# Patient Record
Sex: FEMALE | Race: WHITE | HISPANIC OR LATINO
[De-identification: names, ages, dates, MRNs, and addresses within clinical notes are randomized per-mention and may not be internally consistent; named-entity substitution may affect disease eponyms.]

---

## 2017-02-23 ENCOUNTER — APPOINTMENT (OUTPATIENT)
Dept: VASCULAR SURGERY | Facility: CLINIC | Age: 57
End: 2017-02-23

## 2017-02-23 VITALS
DIASTOLIC BLOOD PRESSURE: 75 MMHG | HEIGHT: 67 IN | WEIGHT: 147 LBS | SYSTOLIC BLOOD PRESSURE: 117 MMHG | BODY MASS INDEX: 23.07 KG/M2 | TEMPERATURE: 98 F | HEART RATE: 65 BPM

## 2017-02-23 DIAGNOSIS — Z82.49 FAMILY HISTORY OF ISCHEMIC HEART DISEASE AND OTHER DISEASES OF THE CIRCULATORY SYSTEM: ICD-10-CM

## 2017-02-23 DIAGNOSIS — Z00.00 ENCOUNTER FOR GENERAL ADULT MEDICAL EXAMINATION W/OUT ABNORMAL FINDINGS: ICD-10-CM

## 2017-02-23 DIAGNOSIS — Z86.19 PERSONAL HISTORY OF OTHER INFECTIOUS AND PARASITIC DISEASES: ICD-10-CM

## 2017-02-23 DIAGNOSIS — Z83.3 FAMILY HISTORY OF DIABETES MELLITUS: ICD-10-CM

## 2017-02-24 PROBLEM — Z86.19 HISTORY OF HUMAN IMMUNODEFICIENCY VIRUS INFECTION: Status: RESOLVED | Noted: 2017-02-24 | Resolved: 2017-02-24

## 2017-02-24 RX ORDER — ENOXAPARIN SODIUM 100 MG/ML
100 INJECTION SUBCUTANEOUS
Qty: 15 | Refills: 0 | Status: COMPLETED | COMMUNITY
Start: 2016-11-11 | End: 2017-02-24

## 2017-02-24 RX ORDER — ZOLPIDEM TARTRATE 10 MG/1
10 TABLET ORAL
Qty: 30 | Refills: 0 | Status: ACTIVE | COMMUNITY
Start: 2016-11-17

## 2017-02-24 RX ORDER — ACETAMINOPHEN 325 MG/1
325 TABLET ORAL
Qty: 180 | Refills: 0 | Status: ACTIVE | COMMUNITY
Start: 2016-10-21

## 2017-02-24 RX ORDER — AMOXICILLIN 500 MG/1
500 CAPSULE ORAL
Qty: 21 | Refills: 0 | Status: ACTIVE | COMMUNITY
Start: 2016-09-09

## 2017-02-24 RX ORDER — OXYBUTYNIN CHLORIDE 5 MG/1
5 TABLET, EXTENDED RELEASE ORAL
Qty: 30 | Refills: 0 | Status: ACTIVE | COMMUNITY
Start: 2016-08-31

## 2017-02-24 RX ORDER — WARFARIN 5 MG/1
5 TABLET ORAL
Qty: 60 | Refills: 0 | Status: COMPLETED | COMMUNITY
Start: 2016-11-22 | End: 2017-02-24

## 2017-02-24 RX ORDER — WARFARIN 10 MG/1
10 TABLET ORAL
Qty: 30 | Refills: 0 | Status: COMPLETED | COMMUNITY
Start: 2016-12-08 | End: 2017-02-24

## 2017-02-24 RX ORDER — SERTRALINE HYDROCHLORIDE 50 MG/1
50 TABLET, FILM COATED ORAL
Qty: 30 | Refills: 0 | Status: ACTIVE | COMMUNITY
Start: 2016-11-17

## 2017-02-24 RX ORDER — MELOXICAM 7.5 MG/1
7.5 TABLET ORAL
Qty: 30 | Refills: 0 | Status: ACTIVE | COMMUNITY
Start: 2017-02-16

## 2017-02-24 RX ORDER — WARFARIN 4 MG/1
4 TABLET ORAL
Qty: 30 | Refills: 0 | Status: COMPLETED | COMMUNITY
Start: 2016-10-28 | End: 2017-02-24

## 2017-02-24 RX ORDER — TRAZODONE HYDROCHLORIDE 50 MG/1
50 TABLET ORAL
Qty: 30 | Refills: 0 | Status: ACTIVE | COMMUNITY
Start: 2016-11-17

## 2017-03-09 ENCOUNTER — APPOINTMENT (OUTPATIENT)
Dept: VASCULAR SURGERY | Facility: CLINIC | Age: 57
End: 2017-03-09

## 2017-03-20 ENCOUNTER — APPOINTMENT (OUTPATIENT)
Dept: OTOLARYNGOLOGY | Facility: CLINIC | Age: 57
End: 2017-03-20

## 2017-05-25 ENCOUNTER — APPOINTMENT (OUTPATIENT)
Dept: VASCULAR SURGERY | Facility: CLINIC | Age: 57
End: 2017-05-25

## 2017-06-08 ENCOUNTER — APPOINTMENT (OUTPATIENT)
Dept: VASCULAR SURGERY | Facility: CLINIC | Age: 57
End: 2017-06-08

## 2017-06-08 VITALS
DIASTOLIC BLOOD PRESSURE: 86 MMHG | SYSTOLIC BLOOD PRESSURE: 143 MMHG | HEART RATE: 71 BPM | HEIGHT: 67 IN | TEMPERATURE: 97.7 F | BODY MASS INDEX: 21.5 KG/M2 | WEIGHT: 137 LBS

## 2017-09-14 ENCOUNTER — APPOINTMENT (OUTPATIENT)
Dept: VASCULAR SURGERY | Facility: CLINIC | Age: 57
End: 2017-09-14
Payer: MEDICAID

## 2017-09-14 VITALS — DIASTOLIC BLOOD PRESSURE: 80 MMHG | HEART RATE: 108 BPM | SYSTOLIC BLOOD PRESSURE: 120 MMHG

## 2017-09-14 VITALS
BODY MASS INDEX: 21.5 KG/M2 | SYSTOLIC BLOOD PRESSURE: 114 MMHG | HEIGHT: 67 IN | HEART RATE: 64 BPM | DIASTOLIC BLOOD PRESSURE: 76 MMHG | WEIGHT: 137 LBS | TEMPERATURE: 97.8 F

## 2017-09-14 DIAGNOSIS — S85.599A: ICD-10-CM

## 2017-09-14 PROCEDURE — 99214 OFFICE O/P EST MOD 30 MIN: CPT | Mod: 25

## 2017-09-14 PROCEDURE — 93970 EXTREMITY STUDY: CPT

## 2017-10-13 ENCOUNTER — APPOINTMENT (OUTPATIENT)
Dept: VASCULAR SURGERY | Facility: CLINIC | Age: 57
End: 2017-10-13
Payer: MEDICAID

## 2017-10-13 PROCEDURE — 36478 ENDOVENOUS LASER 1ST VEIN: CPT

## 2017-10-16 ENCOUNTER — APPOINTMENT (OUTPATIENT)
Dept: VASCULAR SURGERY | Facility: CLINIC | Age: 57
End: 2017-10-16

## 2017-10-16 ENCOUNTER — APPOINTMENT (OUTPATIENT)
Dept: VASCULAR SURGERY | Facility: CLINIC | Age: 57
End: 2017-10-16
Payer: MEDICAID

## 2017-10-16 ENCOUNTER — OTHER (OUTPATIENT)
Age: 57
End: 2017-10-16

## 2017-10-16 PROCEDURE — 93971 EXTREMITY STUDY: CPT | Mod: RT

## 2017-10-19 ENCOUNTER — APPOINTMENT (OUTPATIENT)
Dept: VASCULAR SURGERY | Facility: CLINIC | Age: 57
End: 2017-10-19
Payer: MEDICAID

## 2017-10-19 ENCOUNTER — APPOINTMENT (OUTPATIENT)
Dept: VASCULAR SURGERY | Facility: CLINIC | Age: 57
End: 2017-10-19

## 2017-10-19 PROCEDURE — 93971 EXTREMITY STUDY: CPT | Mod: RT

## 2017-10-23 ENCOUNTER — APPOINTMENT (OUTPATIENT)
Dept: VASCULAR SURGERY | Facility: CLINIC | Age: 57
End: 2017-10-23
Payer: MEDICAID

## 2017-10-23 PROCEDURE — 93971 EXTREMITY STUDY: CPT | Mod: RT

## 2017-10-25 ENCOUNTER — OTHER (OUTPATIENT)
Age: 57
End: 2017-10-25

## 2017-11-06 ENCOUNTER — MEDICATION RENEWAL (OUTPATIENT)
Age: 57
End: 2017-11-06

## 2017-11-06 ENCOUNTER — APPOINTMENT (OUTPATIENT)
Dept: VASCULAR SURGERY | Facility: CLINIC | Age: 57
End: 2017-11-06
Payer: MEDICAID

## 2017-11-06 PROCEDURE — 93971 EXTREMITY STUDY: CPT

## 2017-11-16 ENCOUNTER — APPOINTMENT (OUTPATIENT)
Dept: VASCULAR SURGERY | Facility: CLINIC | Age: 57
End: 2017-11-16

## 2017-11-30 ENCOUNTER — APPOINTMENT (OUTPATIENT)
Dept: VASCULAR SURGERY | Facility: CLINIC | Age: 57
End: 2017-11-30
Payer: MEDICAID

## 2017-11-30 VITALS
HEART RATE: 78 BPM | HEIGHT: 67 IN | DIASTOLIC BLOOD PRESSURE: 79 MMHG | SYSTOLIC BLOOD PRESSURE: 129 MMHG | WEIGHT: 137 LBS | BODY MASS INDEX: 21.5 KG/M2 | TEMPERATURE: 97.4 F

## 2017-11-30 PROCEDURE — 99214 OFFICE O/P EST MOD 30 MIN: CPT | Mod: 25

## 2018-03-15 ENCOUNTER — APPOINTMENT (OUTPATIENT)
Dept: VASCULAR SURGERY | Facility: CLINIC | Age: 58
End: 2018-03-15

## 2018-06-29 ENCOUNTER — APPOINTMENT (OUTPATIENT)
Dept: VASCULAR SURGERY | Facility: CLINIC | Age: 58
End: 2018-06-29

## 2018-09-18 ENCOUNTER — EMERGENCY (EMERGENCY)
Facility: HOSPITAL | Age: 58
LOS: 1 days | Discharge: HOME | End: 2018-09-18
Attending: EMERGENCY MEDICINE
Payer: MEDICAID

## 2018-09-18 VITALS
RESPIRATION RATE: 18 BRPM | TEMPERATURE: 98 F | OXYGEN SATURATION: 98 % | SYSTOLIC BLOOD PRESSURE: 130 MMHG | HEART RATE: 80 BPM | DIASTOLIC BLOOD PRESSURE: 80 MMHG

## 2018-09-18 DIAGNOSIS — Z98.89 OTHER SPECIFIED POSTPROCEDURAL STATES: Chronic | ICD-10-CM

## 2018-09-18 DIAGNOSIS — Z98.51 TUBAL LIGATION STATUS: Chronic | ICD-10-CM

## 2018-09-18 PROCEDURE — 99283 EMERGENCY DEPT VISIT LOW MDM: CPT

## 2018-09-18 PROCEDURE — 99284 EMERGENCY DEPT VISIT MOD MDM: CPT | Mod: 25

## 2018-09-18 PROCEDURE — 71046 X-RAY EXAM CHEST 2 VIEWS: CPT

## 2018-09-18 PROCEDURE — 71046 X-RAY EXAM CHEST 2 VIEWS: CPT | Mod: 26

## 2018-09-18 RX ORDER — PSEUDOEPHEDRINE HCL 30 MG
1 TABLET ORAL
Qty: 15 | Refills: 0 | OUTPATIENT
Start: 2018-09-18

## 2018-09-18 RX ADMIN — Medication 100 MILLIGRAM(S): at 17:54

## 2018-09-18 NOTE — ED PROVIDER NOTE - OBJECTIVE STATEMENT
57 y/o F patient with a significant PMHx of Bipolar disorder, Bronchitis, Depression, b/l dry eyes, cardiac murmur, substance abuse, Hepatitis B, Hepatitis C, HIV (last CD4 count 370, viral load undetectable), Insomnia, Osteoporosis, Popliteal aneurysm, Positive PPD treated in , Prediabetes, PTSD, Scoliosis and a significant PSHx of right inguinal hernia repair, tubal ligation,  section, lumpectomy, presents to the ED with a x2 day history of rhinorrhea, cough, nasal congestion and headache. Patient denies fever, shortness of breath, chest pain, urinary complaints, abdominal complaints or any other complaints. Patient is unable to tolerate PO. Patient is compliant with HIV medication and does not take prophylactic antibiotics. NKDA.

## 2018-09-18 NOTE — ED PROVIDER NOTE - PMH
Bipolar disorder    Bronchitis    Depression    Dry eyes, bilateral    H/O cardiac murmur    H/O: substance abuse    Hepatitis B    Hepatitis C    HIV (human immunodeficiency virus infection)    Insomnia    Osteoporosis    Popliteal aneurysm  right  Positive PPD, treated  1978 "I took INH snd B12 for a whole year, I have CXR's now"  Prediabetes    PTSD (post-traumatic stress disorder)    Scoliosis

## 2018-09-18 NOTE — ED PROVIDER NOTE - PROGRESS NOTE DETAILS
Patients cough improved with tessalon perles. CXR negative. Will D/C with doxy, sudafed and tessalon. Patient in agreement.

## 2018-09-18 NOTE — ED PROVIDER NOTE - PSH
H/O right inguinal hernia repair  x2-2007  H/O tubal ligation    H/O:  section    History of lumpectomy of left breast  2004

## 2018-09-18 NOTE — ED ADULT NURSE NOTE - NSIMPLEMENTINTERV_GEN_ALL_ED
Implemented All Universal Safety Interventions:  [facility] to call system. Call bell, personal items and telephone within reach. Instruct patient to call for assistance. Room bathroom lighting operational. Non-slip footwear when patient is off stretcher. Physically safe environment: no spills, clutter or unnecessary equipment. Stretcher in lowest position, wheels locked, appropriate side rails in place.

## 2018-09-18 NOTE — ED PROVIDER NOTE - MEDICAL DECISION MAKING DETAILS
Patient with headache and cough. Obtain chest X-ray and treat with Tessalon Perls, Doxycyline and Sudafed.

## 2019-05-22 NOTE — ED ADULT NURSE NOTE - CAS DISCH TRANSFER METHOD
Patient has an upcoming visit with Dr Cantu scheduled for 6/19/19.  Blood pressure to be discussed at that time.   Private car

## 2019-10-23 ENCOUNTER — APPOINTMENT (OUTPATIENT)
Dept: PULMONOLOGY | Facility: CLINIC | Age: 59
End: 2019-10-23
Payer: MEDICAID

## 2019-10-23 VITALS
OXYGEN SATURATION: 98 % | SYSTOLIC BLOOD PRESSURE: 133 MMHG | BODY MASS INDEX: 24.01 KG/M2 | HEIGHT: 67 IN | WEIGHT: 153 LBS | TEMPERATURE: 98.1 F | DIASTOLIC BLOOD PRESSURE: 83 MMHG | HEART RATE: 71 BPM | RESPIRATION RATE: 18 BRPM

## 2019-10-23 PROCEDURE — 94060 EVALUATION OF WHEEZING: CPT

## 2019-10-23 PROCEDURE — 99204 OFFICE O/P NEW MOD 45 MIN: CPT | Mod: 25

## 2019-10-23 PROCEDURE — ZZZZZ: CPT

## 2019-10-23 PROCEDURE — 94726 PLETHYSMOGRAPHY LUNG VOLUMES: CPT

## 2019-10-23 PROCEDURE — 94729 DIFFUSING CAPACITY: CPT

## 2019-10-23 NOTE — END OF VISIT
[FreeTextEntry3] : I directly supervised nurse practitioner Delta Romero and was present during key points of his history and physical. I agree with his history, physical and assessment.\par

## 2019-10-23 NOTE — ASSESSMENT
[FreeTextEntry1] : Abnormal Chest CT: Patient advised to obtain CD of CT scan and drop off at office. Will review and call patient with findings and next steps, if needed. PFT today is normal, patient asymptomatic. Personally reviewed CT scans from 2002 and 2003 demonstrating 4mm RML nodule.

## 2019-10-23 NOTE — HISTORY OF PRESENT ILLNESS
[FreeTextEntry1] : 60yo F with PMH lung nodule (2002), substance abuse, anxiety, depression presents for evaluation of abnormal CT scan.\par \par Referral form states stable 5mm lung nodule but also with RLL bronchiolitic changes. No report or imaging is available for review.\par \par Patient denies hx of smoking tobacco however smoked crack for about 8 years, most days of the week, quit in 1991. Has not inhaled any substances since she quit in 1991.\par \par Denies hx of lung disease. Asymptomatic from pulmonary perspective. Denies fevers, chills, dyspnea, chest pain/tightness, cough, wheeze.

## 2019-10-23 NOTE — PHYSICAL EXAM
[General Appearance - Well Developed] : well developed [Normal Appearance] : normal appearance [Well Groomed] : well groomed [General Appearance - Well Nourished] : well nourished [General Appearance - In No Acute Distress] : no acute distress [No Deformities] : no deformities [Eyelids - No Xanthelasma] : the eyelids demonstrated no xanthelasmas [Normal Conjunctiva] : the conjunctiva exhibited no abnormalities [Normal Oropharynx] : normal oropharynx [Neck Cervical Mass (___cm)] : no neck mass was observed [Neck Appearance] : the appearance of the neck was normal [Thyroid Diffuse Enlargement] : the thyroid was not enlarged [Thyroid Nodule] : there were no palpable thyroid nodules [Jugular Venous Distention Increased] : there was no jugular-venous distention [Heart Rate And Rhythm] : heart rate and rhythm were normal [Heart Sounds] : normal S1 and S2 [Murmurs] : no murmurs present [Respiration, Rhythm And Depth] : normal respiratory rhythm and effort [Exaggerated Use Of Accessory Muscles For Inspiration] : no accessory muscle use [Abdomen Soft] : soft [Auscultation Breath Sounds / Voice Sounds] : lungs were clear to auscultation bilaterally [Abdomen Tenderness] : non-tender [Abdomen Mass (___ Cm)] : no abdominal mass palpated [Gait - Sufficient For Exercise Testing] : the gait was sufficient for exercise testing [Abnormal Walk] : normal gait [Nail Clubbing] : no clubbing of the fingernails [Petechial Hemorrhages (___cm)] : no petechial hemorrhages [Cyanosis, Localized] : no localized cyanosis [Skin Color & Pigmentation] : normal skin color and pigmentation [Deep Tendon Reflexes (DTR)] : deep tendon reflexes were 2+ and symmetric [] : no rash [Skin Turgor] : normal skin turgor [Sensation] : the sensory exam was normal to light touch and pinprick [Oriented To Time, Place, And Person] : oriented to person, place, and time [No Focal Deficits] : no focal deficits [Affect] : the affect was normal [Impaired Insight] : insight and judgment were intact

## 2019-10-23 NOTE — CONSULT LETTER
[Dear  ___] : Dear  [unfilled], [Please see my note below.] : Please see my note below. [Consult Closing:] : Thank you very much for allowing me to participate in the care of this patient.  If you have any questions, please do not hesitate to contact me. [Sincerely,] : Sincerely, [FreeTextEntry2] : Jessica Perrin\par [address]\par [place], [place]\par  [FreeTextEntry3] : Homar Frazeir MD

## 2020-09-18 ENCOUNTER — APPOINTMENT (OUTPATIENT)
Dept: PULMONOLOGY | Facility: CLINIC | Age: 60
End: 2020-09-18

## 2020-09-25 ENCOUNTER — APPOINTMENT (OUTPATIENT)
Dept: PULMONOLOGY | Facility: CLINIC | Age: 60
End: 2020-09-25
Payer: MEDICAID

## 2020-09-25 VITALS
HEIGHT: 67 IN | BODY MASS INDEX: 24.48 KG/M2 | TEMPERATURE: 97.2 F | RESPIRATION RATE: 16 BRPM | OXYGEN SATURATION: 100 % | DIASTOLIC BLOOD PRESSURE: 85 MMHG | SYSTOLIC BLOOD PRESSURE: 129 MMHG | HEART RATE: 90 BPM | WEIGHT: 156 LBS

## 2020-09-25 PROCEDURE — 99214 OFFICE O/P EST MOD 30 MIN: CPT

## 2020-09-25 NOTE — HISTORY OF PRESENT ILLNESS
[TextBox_4] : 60yo F with PMH lung nodule (2002), substance abuse, anxiety, depression presents for evaluation of abnormal CT scan.\par \par Patient denies hx of smoking tobacco however smoked crack for about 8 years, most days of the week, quit in 1991. Has not inhaled any substances since she quit in 1991.\par \par Denies hx of lung disease. Asymptomatic from pulmonary perspective. Denies fevers, chills, dyspnea, chest pain/tightness, cough, wheeze. \par \par Today she presents with report from abnormal CT on 9/20/19 performed at [facility].\par \par ~~~~~\par CT Chest 9/20/19 IMPRESSION: (Comparison: 4/19/2019) The right lower lobe nodule is stable. However, the small patch of tree-in-bud opacity in the left lower lobe is more confluent and denser denoting worsening small airways disease (bronchioloitis).\par ~~~~~

## 2020-09-25 NOTE — CONSULT LETTER
[Dear  ___] : Dear  [unfilled], [Please see my note below.] : Please see my note below. [Consult Closing:] : Thank you very much for allowing me to participate in the care of this patient.  If you have any questions, please do not hesitate to contact me. [Sincerely,] : Sincerely, [FreeTextEntry2] : Jessica Perrin\par [address]\par [place], [place]\par  [FreeTextEntry3] : Homar Frazier MD

## 2020-09-25 NOTE — ASSESSMENT
[FreeTextEntry1] : Pulmonary Nodule: Patient remains asymptomatic. Have now been able to review report and images, 5mm nodule stable from April 2019 to September 2019. Will repeat CT now at 1 year interval, call with results.\par \par STEPHANIE, Delta Romero NP, am scribing for and in the presence of Dr. Homar Frazier, the following sections HISTORY OF PRESENT ILLNESS, PAST MEDICAL/FAMILY/SOCIAL HISTORY; REVIEW OF SYSTEMS; VITAL SIGNS; PHYSICAL EXAM; DISPOSITION.

## 2020-12-07 ENCOUNTER — APPOINTMENT (OUTPATIENT)
Dept: PULMONOLOGY | Facility: CLINIC | Age: 60
End: 2020-12-07
Payer: MEDICAID

## 2020-12-07 VITALS
DIASTOLIC BLOOD PRESSURE: 83 MMHG | SYSTOLIC BLOOD PRESSURE: 130 MMHG | RESPIRATION RATE: 18 BRPM | TEMPERATURE: 98 F | WEIGHT: 161 LBS | BODY MASS INDEX: 25.27 KG/M2 | HEART RATE: 72 BPM | HEIGHT: 67 IN | OXYGEN SATURATION: 99 %

## 2020-12-07 DIAGNOSIS — R93.89 ABNORMAL FINDINGS ON DIAGNOSTIC IMAGING OF OTHER SPECIFIED BODY STRUCTURES: ICD-10-CM

## 2020-12-07 PROCEDURE — 99072 ADDL SUPL MATRL&STAF TM PHE: CPT

## 2020-12-07 PROCEDURE — 99214 OFFICE O/P EST MOD 30 MIN: CPT

## 2020-12-07 NOTE — ASSESSMENT
[FreeTextEntry1] : Pulmonary Nodule: Patient remains asymptomatic. Now stable since April 2019. No further f/u needed. Patient advised to call office if symptoms present.\par \par I, Delta Romero NP, am scribing for and in the presence of Dr. Homar Frazier, the following sections HISTORY OF PRESENT ILLNESS, PAST MEDICAL/FAMILY/SOCIAL HISTORY; REVIEW OF SYSTEMS; VITAL SIGNS; PHYSICAL EXAM; DISPOSITION.

## 2020-12-07 NOTE — CONSULT LETTER
[Dear  ___] : Dear  [unfilled], [Please see my note below.] : Please see my note below. [Consult Closing:] : Thank you very much for allowing me to participate in the care of this patient.  If you have any questions, please do not hesitate to contact me. [Sincerely,] : Sincerely, [FreeTextEntry2] : Jessica Perrin\par [address]\par [place], [place]\par  [FreeTextEntry3] : Homar Frazier MD

## 2020-12-07 NOTE — HISTORY OF PRESENT ILLNESS
[TextBox_4] : 58yo F with PMH lung nodule (2002), substance abuse, anxiety, depression presents for evaluation of abnormal CT scan.\par \par Patient denies hx of smoking tobacco however smoked crack for about 8 years, most days of the week, quit in 1991. Has not inhaled any substances since she quit in 1991.\par \par She recently had cough, sore throat. COVID negative at PCP office, given inhaler plus OTC cough syrup. Now feeling improved. At baseline is asymptomatic from a pulmonary perspective.\par \par Today she presents with f/u CT to initially abnormal CT on 9/20/19 performed at [facility].\par \par ~~~~~\par CT Chest 10/22/2020 IMPRESSION: \par 1. Decreased size and conspicuity of right lower lobe solid nodule now measuring up to 4mm. No new pulmonary nodule is identified.\par 2. Decreased conspicuity of previously seen left lower lobe peripheral tree-in-bud nodularity, consistent with improving bronchiolitis.\par ~~~~~

## 2022-04-15 ENCOUNTER — APPOINTMENT (OUTPATIENT)
Dept: VASCULAR SURGERY | Facility: CLINIC | Age: 62
End: 2022-04-15
Payer: MEDICAID

## 2022-04-22 ENCOUNTER — APPOINTMENT (OUTPATIENT)
Dept: VASCULAR SURGERY | Facility: CLINIC | Age: 62
End: 2022-04-22
Payer: MEDICAID

## 2022-04-22 VITALS
SYSTOLIC BLOOD PRESSURE: 134 MMHG | DIASTOLIC BLOOD PRESSURE: 82 MMHG | WEIGHT: 170 LBS | HEIGHT: 67 IN | TEMPERATURE: 97 F | HEART RATE: 74 BPM | BODY MASS INDEX: 26.68 KG/M2

## 2022-04-22 VITALS — HEART RATE: 79 BPM | DIASTOLIC BLOOD PRESSURE: 83 MMHG | SYSTOLIC BLOOD PRESSURE: 123 MMHG

## 2022-04-22 PROCEDURE — 93970 EXTREMITY STUDY: CPT

## 2022-04-22 PROCEDURE — 99204 OFFICE O/P NEW MOD 45 MIN: CPT

## 2022-04-22 NOTE — HISTORY OF PRESENT ILLNESS
[FreeTextEntry1] : MARTINA GATES is a 61 year old female with PMHx significant for HIV, HLD, and lumbar arthritis who was previously seen in our office in [id number] for venous insufficiency. 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 Benoit in 2016. She completed 3 months of anticoagulation post-operatively.\par \par She is also s/p R GSV EVLT in 2017. 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 Patient was treated with Xarelto with resolution of thrombus. \par \par Today the patient reports bilateral leg discomfort associated with swelling, fatigue, heaviness, achiness, muscle cramping, itchiness, dry, flaky skin, and skin darkening at the ankles. \par Patient complains of bilateral painful varicose veins.\par Patient's symptoms have persisted despite conservative management with leg elevation, exercise, and compression stocking use for more than 3 months. \par Patient's symptoms are aggravated by prolonged standing, prolonged sitting, exercise.\par Nothing helps to alleviate patient's symptoms. \par Patient's symptoms interfere with the patient's ADLs such as work, shopping and housekeeping.  \par \par Patient's risks factor for venous disease are multiple pregnancies, family history of venous disease (mother, sisters), history of varicose veins, spider veins and deep vein thrombosis. \par \par Previous treatment, vein procedures and vein surgeries include: \par - wearing compression stockings for more than 3 months with little or no relief \par - vein ablations / vein surgery\par \par PMH: HIV on Genvoya, HLD, peripheral neuropathy, lumbar arthritis, provoked RLE DVT after vein ablation\par Psx: R popliteal vein aneurysm repair, R GSV EVLT, R inguinal hernia repair x 2\par NKDA\par \par VLE today is negative for DVT/SVT. R GSV is ablated in the thigh. Reflux is present in the R SFJ, R calf GSV, and L GSV. R popliteal vein measures 1.7 cm s/p repair (size stable since 2017). L popliteal vein is aneurysmal at 1.9 cm (1.1 cm previously in 2017). Per verbal report from tech, no thrombus present in popliteal venous aneurysms bilaterally.

## 2022-04-22 NOTE — PHYSICAL EXAM
[2+] : left 2+ [Ankle Swelling On The Left] : moderate [Calm] : calm [Skin Ulcer] : no ulcer [de-identified] : well appearing female, NAD  [de-identified] : unlabored [FreeTextEntry1] : LE vein findings: \par Varicosities (spider, reticular, varicose)  bilateral\par Edema mild bilateral \par Skin changes - dry, flaky skin, stasis dermatitis, hyperpigmentation in the gator area\par No Ulcer \par CEAP classification C4\par Palpable DP pulses bilaterally\par \par  [de-identified] : soft, NT [de-identified] : well healed S shaped incision in R popliteal fossa

## 2022-04-22 NOTE — ASSESSMENT
[FreeTextEntry1] : Ms. MARTINA GATES is a 61 year female with persistent and worsening bilateral lower extremity venous insufficiency, CEAP classification C4 which is unresponsive to at least 3 months of compression stockings 20-30 mmHg, leg elevation, and exercise. Patient has complaints of worsening bilateral leg discomfort with swelling, fatigue, heaviness, achiness, cramping, and painful varicosities.  The patient’s symptoms interfere with their ADL’s, such as walking, shopping and house work, and their ability to work and exercise. Patient has intact pulses in both legs without evidence of arterial insufficiency.  \par \par Treatment is indicated not for cosmetic reasons but for symptomatic venous reflux disease with symptoms which are refractory to conservative therapy. Venous duplex study demonstrates L GSV reflux in additional to large tributaries bilaterally. The need for definitive effective treatment is based on severe, interfering and worsening reflux symptoms with evidence of venous insufficiency on venous ultrasound. \par \par Patient is a candidate for endovenous ablation treatment of the L GSV and bilateral stab phlebectomy procedures. The risks and benefits of proposed treatment versus continued conservative management were discussed with the patient. Patient chooses endovenous ablation treatments. Treatment plan to be scheduled. \par \par Patient is s/p repair of R popliteal vein aneurysm which appears stable on ultrasound today. L popliteal vein is aneurysmal at 1.9 cm. Per verbal report, no thrombus visualized. Findings discussed with Dr. Benoit. No interventions at this time. Will continue to monitor. If L popliteal vein increases in size and/or presents with thrombus then will consider initiating antiplatelet therapy and surgical repair. Patient aware of plan and will schedule 6 month follow-up with surveillance VLE study. \par  \par Patient requests that provider speak with her PMD, Dr. Jessica Perrin ([phone], [phone]) regarding proposed vein procedures. I called the office and left a message with staff member Joni for call back.

## 2022-05-31 RX ORDER — LIDOCAINE HYDROCHLORIDE 10 MG/ML
1 INJECTION, SOLUTION INFILTRATION; PERINEURAL
Qty: 50 | Refills: 0 | Status: COMPLETED | COMMUNITY
Start: 2022-05-31 | End: 2022-06-10

## 2022-05-31 RX ORDER — SODIUM BICARBONATE 84 MG/ML
8.4 INJECTION, SOLUTION INTRAVENOUS
Qty: 5 | Refills: 0 | Status: COMPLETED | COMMUNITY
Start: 2022-05-31 | End: 2022-06-10

## 2022-05-31 RX ORDER — ALPRAZOLAM 0.5 MG/1
0.5 TABLET ORAL
Qty: 1 | Refills: 0 | Status: COMPLETED | COMMUNITY
Start: 2022-05-31 | End: 2022-06-10

## 2022-06-10 ENCOUNTER — APPOINTMENT (OUTPATIENT)
Dept: VASCULAR SURGERY | Facility: CLINIC | Age: 62
End: 2022-06-10
Payer: MEDICAID

## 2022-06-10 PROCEDURE — 36475 ENDOVENOUS RF 1ST VEIN: CPT | Mod: LT

## 2022-06-10 RX ORDER — RIVAROXABAN 20 MG/1
20 TABLET, FILM COATED ORAL DAILY
Qty: 14 | Refills: 0 | Status: COMPLETED | COMMUNITY
Start: 2017-11-06 | End: 2022-06-10

## 2022-06-10 RX ORDER — NAPROXEN 500 MG/1
500 TABLET ORAL
Qty: 30 | Refills: 0 | Status: COMPLETED | COMMUNITY
Start: 2017-01-17 | End: 2022-06-10

## 2022-06-10 RX ORDER — ALPRAZOLAM 0.5 MG/1
0.5 TABLET ORAL
Qty: 1 | Refills: 0 | Status: COMPLETED | COMMUNITY
Start: 2017-10-10 | End: 2022-06-10

## 2022-06-10 RX ORDER — IBUPROFEN 800 MG/1
800 TABLET, FILM COATED ORAL
Qty: 20 | Refills: 0 | Status: COMPLETED | COMMUNITY
Start: 2016-09-09 | End: 2022-06-10

## 2022-06-10 NOTE — PROCEDURE
[FreeTextEntry1] : left GSV RFA [FreeTextEntry3] : Procedural safety checklist and time out completed:\par Confirmed patient identification (Patient Name, , and/or medical record number including when possible affirmation by patient or parent/family/other).\par Confirmed procedure with the patient. Consent present, accurate and signed. \par Confirmed special equipment and supplies are present.\par Sterility confirmed. Position verified. \par Site/ side is marked and visible and confirmed. \par Procedure confirmed by consent. Accurate consent including side and site.\par Review of medical records, including venous ultrasound, noting correct procedure including site and side.\par MD/PA verifies presence and review of imaging studies and or written report of imaging studies.\par Agreement on the procedure to be performed\par Time out completed.\par All of the above has been confirmed by the team.\par All patient-specific concerns have been addressed. \par \par Indication: left lower extremity varicose veins with inflammation, leg pain, leg swelling, and leg cramping.  Venous insufficiency/ reflux.\par \par Procedure: radiofrequency ablation of the left great saphenous vein. \par 	\par Ms. MARTINA GATES is a 61 year old F with a history of  left lower extremity varicose veins previously seen in the office.  Ultrasound examination demonstrated venous insufficiency. A trial of compression stockings, exercise, elevation, and pain medication was attempted without relief and definitive treatment with radiofrequency ablation was offered. \par \par The patient has come for radiofrequency ablation treatment of the left great saphenous vein.\par I have discussed the risks of the procedure at length with the patient. The risks discussed were inclusive of but not limited to infection, irritation at the site of infiltration of local anesthesia, and also rare risk of deep venous thrombosis and pulmonary emboli. The patient agrees to proceed with the procedure. \par The patient was escorted into the procedure room and a time out called.\par The entire limb was prepped and draped in sterile fashion. The RF fiber was placed on the sterile field and connected by a sterile cable. Actuation, temperature, and impedance testing were performed to ensure that all components were connected and operating properly. \par The patient was placed on the procedure table and local anesthesia was instilled in the skin overlying the access site. Under ultrasound guidance, the vein was punctured with a micropuncture needle, using the anterior wall technique. A guide wire was now introduced through the needle, and the needle was then exchanged over the guide wire for a 7F sheath. The guide wire was removed and the RF probe was then placed into the left great saphenous vein through the sheath and position confirmed using ultrasound guidance. After the RF probe position was verified by ultrasound, tumescent anesthesia consisting of normal saline, 1% lidocaine with 8.4% sodium bicarbonate was infiltrated, under ultrasound guidance, precisely into the perivenous compartment along the entire length of the vein until a “halo” of fluid was noted around the vein. After RF probe position was again confirmed with ultrasound imaging, RF energy was applied. The probe was gradually and carefully withdrawn at a rate of 6.5cm/20seconds. \par \par 10 cycles of RF performed using the 7 cm probe\par Total treatment time was 200 seconds.\par The total volume injected was 450 cc\par Treatment length was 46 cm and \par The probe is 3.5 cm from the SFJ.\par \par Estimated Blood Loss: minimal\par Repeat ultrasound of the treated vein was performed confirming successful treatment. The catheter and sheath were withdrawn and hemostasis established with direct pressure. After assuring hemostasis, a sterile 4x4 was placed on the access site and an ACE compression wrap was applied. Patient tolerated procedure well. Patient was given post-procedure instructions and follow up appointment was scheduled.\par \par \par

## 2022-06-13 ENCOUNTER — APPOINTMENT (OUTPATIENT)
Dept: VASCULAR SURGERY | Facility: CLINIC | Age: 62
End: 2022-06-13
Payer: MEDICAID

## 2022-06-13 PROCEDURE — 93971 EXTREMITY STUDY: CPT

## 2022-06-14 RX ORDER — ALPRAZOLAM 0.5 MG/1
0.5 TABLET ORAL
Qty: 1 | Refills: 0 | Status: COMPLETED | COMMUNITY
Start: 2022-06-14 | End: 2022-06-24

## 2022-06-24 ENCOUNTER — APPOINTMENT (OUTPATIENT)
Dept: VASCULAR SURGERY | Facility: CLINIC | Age: 62
End: 2022-06-24
Payer: MEDICAID

## 2022-06-24 VITALS
WEIGHT: 167 LBS | HEART RATE: 58 BPM | SYSTOLIC BLOOD PRESSURE: 148 MMHG | BODY MASS INDEX: 26.21 KG/M2 | HEIGHT: 67 IN | TEMPERATURE: 95.1 F | DIASTOLIC BLOOD PRESSURE: 88 MMHG

## 2022-06-24 VITALS — SYSTOLIC BLOOD PRESSURE: 141 MMHG | HEART RATE: 58 BPM | DIASTOLIC BLOOD PRESSURE: 86 MMHG

## 2022-06-24 PROCEDURE — 99212 OFFICE O/P EST SF 10 MIN: CPT

## 2022-06-24 PROCEDURE — 93971 EXTREMITY STUDY: CPT

## 2022-06-24 NOTE — HISTORY OF PRESENT ILLNESS
[FreeTextEntry1] : MARTINA GATES is a 61 year old female who presents today for follow up venous ultrasound. \par \par She has a history of chronic venous insufficiency. She is recently s/p L GSV RFA on 06/10/2022. Post-procedure ultrasound on 06/13/2022 demonstrated successful ablation of L GSV and evidence of eHIT 1. Patient presents today for repeat VLE which demonstrates an eHIT 3. \par \par Patient has no concerning symptoms today. She reports feeling well overall and reports improvement in LLE symptoms. She denies any swelling, pain in the leg/groin, shortness of breath, chest pain/discomfort. \par \par

## 2022-06-24 NOTE — ASSESSMENT
[FreeTextEntry1] : A/P:\par \par Patient is s/p L GSV RFA now with eHIT 3 requiring anticoagulation. Script sent for Xarelto 20 mg QD for one month. Patient advised to start medication today. She will be scheduled to return for repeat VLE prior to completing course of anticoagulation. Patient advised to call if any new or worsening symptoms.\par \par Stab phlebectomy procedure to be rescheduled until LLE is re-evaluated. \par \par

## 2022-07-08 ENCOUNTER — APPOINTMENT (OUTPATIENT)
Dept: VASCULAR SURGERY | Facility: CLINIC | Age: 62
End: 2022-07-08

## 2022-07-15 ENCOUNTER — APPOINTMENT (OUTPATIENT)
Dept: VASCULAR SURGERY | Facility: CLINIC | Age: 62
End: 2022-07-15

## 2022-07-21 ENCOUNTER — APPOINTMENT (OUTPATIENT)
Dept: VASCULAR SURGERY | Facility: CLINIC | Age: 62
End: 2022-07-21

## 2022-07-21 VITALS
HEART RATE: 74 BPM | WEIGHT: 167 LBS | BODY MASS INDEX: 26.21 KG/M2 | SYSTOLIC BLOOD PRESSURE: 106 MMHG | DIASTOLIC BLOOD PRESSURE: 72 MMHG | TEMPERATURE: 97.9 F | HEIGHT: 67 IN

## 2022-07-21 VITALS — HEART RATE: 79 BPM | DIASTOLIC BLOOD PRESSURE: 79 MMHG | SYSTOLIC BLOOD PRESSURE: 122 MMHG

## 2022-07-21 PROCEDURE — 99212 OFFICE O/P EST SF 10 MIN: CPT

## 2022-07-21 PROCEDURE — 93971 EXTREMITY STUDY: CPT

## 2022-07-23 NOTE — ASSESSMENT
[FreeTextEntry1] : A/P:\par \par Patient s/p L GSV RFA complicated by eHIT which appears to be improving while on anticoagulation. Recommend 3 month total duration of Xarelto. DVT most likely provoked given recent procedure - however, given that this is patient's second eHIT highly recommend hematology consultation to rule out hypercoagulability. Patient advised to speak with her PMD to this regard. She will follow up in 2 months for repeat LLE VLE.

## 2022-07-23 NOTE — PHYSICAL EXAM
[2+] : left 2+ [Ankle Swelling (On Exam)] : not present [Ankle Swelling On The Left] : moderate [Skin Ulcer] : no ulcer [Calm] : calm [de-identified] : well appearing female, NAD  [de-identified] : unlabored [FreeTextEntry1] : scattered VV bilaterally [de-identified] : soft, NT

## 2022-07-23 NOTE — HISTORY OF PRESENT ILLNESS
[FreeTextEntry1] : MARTINA GATES is a 62 year old female who presents today for follow up evaluation. She is s/p L GSV RFA on 6/10/2022 with finding of post-op eHIT 3 on ultrasound from 6/24/2022. She was started on anticoagulation. Patient reports compliance with Xarelto 20 mg daily. Has no complaints today. \par \par Repeat VLE from today demonstrates an eHIT 1 (<50%  extension of chronic ablation into CFV). Rouleaux flow in common femoral vein. Popliteal dilatation 1.9 cm.

## 2022-07-23 NOTE — REVIEW OF SYSTEMS
[Feeling Poorly] : not feeling poorly [Feeling Tired] : not feeling tired [Chest Pain] : no chest pain [Palpitations] : no palpitations [Lower Ext Edema] : no lower extremity edema [Shortness Of Breath] : no shortness of breath [FreeTextEntry1] : ROS per HPI

## 2022-08-05 ENCOUNTER — APPOINTMENT (OUTPATIENT)
Dept: VASCULAR SURGERY | Facility: CLINIC | Age: 62
End: 2022-08-05

## 2022-09-16 RX ORDER — RIVAROXABAN 20 MG/1
20 TABLET, FILM COATED ORAL
Qty: 7 | Refills: 0 | Status: ACTIVE | COMMUNITY
Start: 2022-06-24 | End: 1900-01-01

## 2022-09-22 ENCOUNTER — APPOINTMENT (OUTPATIENT)
Dept: VASCULAR SURGERY | Facility: CLINIC | Age: 62
End: 2022-09-22

## 2022-09-22 VITALS
DIASTOLIC BLOOD PRESSURE: 79 MMHG | TEMPERATURE: 97.8 F | SYSTOLIC BLOOD PRESSURE: 120 MMHG | BODY MASS INDEX: 26.21 KG/M2 | HEART RATE: 76 BPM | HEIGHT: 67 IN | WEIGHT: 167 LBS

## 2022-09-22 VITALS — HEART RATE: 73 BPM | SYSTOLIC BLOOD PRESSURE: 117 MMHG | DIASTOLIC BLOOD PRESSURE: 74 MMHG

## 2022-09-22 DIAGNOSIS — I82.409 ACUTE EMBOLISM AND THROMBOSIS OF UNSPECIFIED DEEP VEINS OF UNSPECIFIED LOWER EXTREMITY: ICD-10-CM

## 2022-09-22 PROCEDURE — 93970 EXTREMITY STUDY: CPT

## 2022-09-22 PROCEDURE — 99212 OFFICE O/P EST SF 10 MIN: CPT

## 2022-09-23 PROBLEM — I82.409 DVT (DEEP VENOUS THROMBOSIS): Status: ACTIVE | Noted: 2017-10-16

## 2022-09-23 NOTE — HISTORY OF PRESENT ILLNESS
[FreeTextEntry1] : MARTINA GATES is a 62 year old female who presents today for follow up evaluation of eHIT. She is s/p L GSV RFA on 6/10/2022 with finding of post-op eHIT 3 on ultrasound from 6/24/2022. \par \par Patient has completed 3 months of anticoagulation with Xarelto. She has no new complaints today. \par \par Repeat VLE from today demonstrates resolution of LLE eHIT. No DVT or SVT in either extremity. She has known popliteal vein aneurysms and is s/p R popliteal vein aneurysmorrhaphy (2016). Today the aneurysm appears stable in size (R 1.5 cm, L 1.8 cm).\par \par PMH: HIV on Genvoya, HLD, peripheral neuropathy, lumbar arthritis, bilateral GSV ablations with eHIT (2017, 2022) requiring short term anitcoagulation\par Psx: R popliteal vein aneurysm repair, R GSV EVLT, L GSV RFA, R inguinal hernia repair x 2\par NKDA

## 2022-09-23 NOTE — PHYSICAL EXAM
[2+] : left 2+ [Ankle Swelling (On Exam)] : not present [Ankle Swelling On The Left] : moderate [Skin Ulcer] : no ulcer [Calm] : calm [de-identified] : well appearing female, NAD  [de-identified] : unlabored [FreeTextEntry1] : scattered VV bilaterally [de-identified] : soft, NT

## 2022-09-23 NOTE — ASSESSMENT
[FreeTextEntry1] : A/P:\par \par Patient with eHIT after endovenous ablation procedure. She has completed 3 months of Xarelto with complete resolution of the DVT. She may stop anticoagulation. Advise hematology consult given history of eHIT x2. Patient given contact information for hematologists in the area. She states that she went to see a hematologist at a different institution and they did not perform a hypercoagulable workup (unclear visit details). Patient advised to call back if she has any difficulties obtaining a hematology consult. \par \par Patient also has history of bilateral popliteal vein aneurysm. She is doing well s/p repair of R popliteal vein aneurysm from several years ago. She has no concerning symptoms and the vessels appears stable in size on ultrasound. Recommend continued surveillance. Patient will follow up in 6 months for repeat duplex.

## 2022-10-25 ENCOUNTER — APPOINTMENT (OUTPATIENT)
Dept: PULMONOLOGY | Facility: CLINIC | Age: 62
End: 2022-10-25

## 2022-10-25 VITALS
HEIGHT: 67 IN | TEMPERATURE: 97.9 F | HEART RATE: 75 BPM | WEIGHT: 162 LBS | RESPIRATION RATE: 16 BRPM | BODY MASS INDEX: 25.43 KG/M2 | SYSTOLIC BLOOD PRESSURE: 121 MMHG | DIASTOLIC BLOOD PRESSURE: 81 MMHG

## 2022-10-25 DIAGNOSIS — M54.9 DORSALGIA, UNSPECIFIED: ICD-10-CM

## 2022-10-25 PROCEDURE — 99214 OFFICE O/P EST MOD 30 MIN: CPT

## 2022-10-25 NOTE — HISTORY OF PRESENT ILLNESS
[TextBox_4] : 60yo F with PMH lung nodule (2002), substance abuse, anxiety, depression, OA spine, Scoliosis  presents for evaluation  2 episodes of severe sudden back pain/ "like someone punched me in my back".Symptoms occurred at rest and gradually resolved with time and relaxing. Pain worsened with movement. Denies SOB with pain. \par \par Today she presents with f/u CT to initially abnormal CT on 9/20/19 performed at [facility].\par ~~~~~\par CT Chest 10/22/2020 IMPRESSION: \par 1. Decreased size and conspicuity of right lower lobe solid nodule now measuring up to 4mm. No new pulmonary nodule is identified.\par 2. Decreased conspicuity of previously seen left lower lobe peripheral tree-in-bud nodularity, consistent with improving bronchiolitis.\par ~~~~~. \par \par Social:\par Never smoker\par Former substance abuse / smoked crack for about 8 years, most days of the week, quit in 1991\par

## 2022-10-25 NOTE — PHYSICAL EXAM
[No Acute Distress] : no acute distress [No Resp Distress] : no resp distress [Clear to Auscultation Bilaterally] : clear to auscultation bilaterally [Normal Gait] : normal gait [No Edema] : no edema [Oriented x3] : oriented x3

## 2022-11-09 ENCOUNTER — NON-APPOINTMENT (OUTPATIENT)
Age: 62
End: 2022-11-09

## 2022-11-21 ENCOUNTER — APPOINTMENT (OUTPATIENT)
Dept: PULMONOLOGY | Facility: CLINIC | Age: 62
End: 2022-11-21

## 2022-11-21 VITALS
SYSTOLIC BLOOD PRESSURE: 148 MMHG | HEIGHT: 67 IN | HEART RATE: 67 BPM | BODY MASS INDEX: 25.58 KG/M2 | WEIGHT: 163 LBS | OXYGEN SATURATION: 99 % | DIASTOLIC BLOOD PRESSURE: 84 MMHG | TEMPERATURE: 98.2 F

## 2022-11-21 PROCEDURE — 99214 OFFICE O/P EST MOD 30 MIN: CPT

## 2022-11-21 NOTE — PHYSICAL EXAM
[No Acute Distress] : no acute distress [Normal Appearance] : normal appearance [No Neck Mass] : no neck mass [Normal Rate/Rhythm] : normal rate/rhythm [Normal S1, S2] : normal s1, s2 [No Murmurs] : no murmurs [No Resp Distress] : no resp distress [Clear to Auscultation Bilaterally] : clear to auscultation bilaterally [Benign] : benign [Normal Gait] : normal gait [No Clubbing] : no clubbing [No Cyanosis] : no cyanosis [No Edema] : no edema [Normal Color/ Pigmentation] : normal color/ pigmentation [No Focal Deficits] : no focal deficits [Oriented x3] : oriented x3 [Normal Affect] : normal affect

## 2022-11-22 NOTE — HISTORY OF PRESENT ILLNESS
[Former] : former [TextBox_4] : 63 yo F PMH lung nodule (2002), substance abuse, anxiety, depression, OA spine, Scoliosis presents for evaluation of pulmonary nodules\par \par ~~~~~\par CT Chest 10/22/2020 IMPRESSION: \par 1. Decreased size and conspicuity of right lower lobe solid nodule now measuring up to 4mm. No new pulmonary nodule is identified.\par 2. Decreased conspicuity of previously seen left lower lobe peripheral tree-in-bud nodularity, consistent with improving bronchiolitis.\par ~~~~~. \par \par Recently underwent CT chest last month with evidence of interval enlargement of lower lobe pulmonary nodule. Denies fevers, chills, chest pain, or weight loss. Intermittent sharp chest pain for the past month. No clear trigger. Reports intermittent cough with productive clear phlegm that has been unchanged. Currently reports that breathing is stable. Has noticed increased in shortness of breath for the past few months when walking up stairs - denies ET limitation on flat surface at moderate pace. \par \par Social:\par Prior tobacco use when smoking crack\par Former substance abuse / smoked crack for about 8 years, most days of the week, quit in 1991\par Previously lived with ex- who owned laser for engraving - notes exposure to chemical fumes.\par \par

## 2022-11-22 NOTE — DISCUSSION/SUMMARY
[FreeTextEntry1] : 61 yo F PMH lung nodule (2002), substance abuse, anxiety, depression, OA spine, Scoliosis presents for evaluation of pulmonary nodules. Noted to have progression of lower lobe pulmonary nodule over past year. Currently without systemic symptoms, though given prior tobacco use will order for PET/CT and nodify serologic testing.\par \par RTC in 1 month

## 2022-12-01 ENCOUNTER — APPOINTMENT (OUTPATIENT)
Dept: NUCLEAR MEDICINE | Facility: IMAGING CENTER | Age: 62
End: 2022-12-01

## 2022-12-01 ENCOUNTER — OUTPATIENT (OUTPATIENT)
Dept: OUTPATIENT SERVICES | Facility: HOSPITAL | Age: 62
LOS: 1 days | End: 2022-12-01
Payer: MEDICAID

## 2022-12-01 DIAGNOSIS — R91.1 SOLITARY PULMONARY NODULE: ICD-10-CM

## 2022-12-01 DIAGNOSIS — Z00.8 ENCOUNTER FOR OTHER GENERAL EXAMINATION: ICD-10-CM

## 2022-12-01 DIAGNOSIS — Z98.89 OTHER SPECIFIED POSTPROCEDURAL STATES: Chronic | ICD-10-CM

## 2022-12-01 DIAGNOSIS — Z98.51 TUBAL LIGATION STATUS: Chronic | ICD-10-CM

## 2022-12-01 PROCEDURE — 78815 PET IMAGE W/CT SKULL-THIGH: CPT | Mod: 26,PI

## 2022-12-01 PROCEDURE — A9552: CPT

## 2022-12-01 PROCEDURE — 78815 PET IMAGE W/CT SKULL-THIGH: CPT

## 2022-12-28 ENCOUNTER — APPOINTMENT (OUTPATIENT)
Dept: PULMONOLOGY | Facility: CLINIC | Age: 62
End: 2022-12-28

## 2022-12-28 VITALS
BODY MASS INDEX: 25.74 KG/M2 | HEART RATE: 84 BPM | WEIGHT: 164 LBS | DIASTOLIC BLOOD PRESSURE: 86 MMHG | HEIGHT: 67 IN | RESPIRATION RATE: 17 BRPM | SYSTOLIC BLOOD PRESSURE: 131 MMHG | TEMPERATURE: 97.4 F | OXYGEN SATURATION: 98 %

## 2022-12-28 PROCEDURE — 99214 OFFICE O/P EST MOD 30 MIN: CPT

## 2022-12-28 NOTE — ASSESSMENT
[FreeTextEntry1] : Lung nodule: PET CT with minimal FDG update , less concerning for malignancy along with nodify results 1% risk of malignancy. Discussed with pt repeat imaging in 6 months for ongoing surveillance. She will call office earliier if she experiences any new/worsening symptoms\par \par RTC in 6 months or earlier prn\par \par I, Marleni Lua, NP, am scribing for and in the presence of Dr. Homar Frazier, the following sections HISTORY OF PRESENT ILLNESS, PAST MEDICAL/FAMILY/SOCIAL HISTORY; REVIEW OF SYSTEMS; VITAL SIGNS; PHYSICAL EXAM; DISPOSITION.\par \par

## 2023-02-02 ENCOUNTER — APPOINTMENT (OUTPATIENT)
Dept: VASCULAR SURGERY | Facility: CLINIC | Age: 63
End: 2023-02-02
Payer: MEDICAID

## 2023-02-02 VITALS — WEIGHT: 164 LBS | BODY MASS INDEX: 25.74 KG/M2 | HEIGHT: 67 IN

## 2023-02-02 VITALS — DIASTOLIC BLOOD PRESSURE: 82 MMHG | SYSTOLIC BLOOD PRESSURE: 113 MMHG | HEART RATE: 80 BPM | TEMPERATURE: 97.4 F

## 2023-02-02 VITALS — DIASTOLIC BLOOD PRESSURE: 80 MMHG | SYSTOLIC BLOOD PRESSURE: 122 MMHG | HEART RATE: 80 BPM

## 2023-02-02 PROCEDURE — 93970 EXTREMITY STUDY: CPT

## 2023-02-02 PROCEDURE — 99213 OFFICE O/P EST LOW 20 MIN: CPT

## 2023-02-02 NOTE — ASSESSMENT
[FreeTextEntry1] : A/P:\par \par Patient with persistent burning and tingling of the lower extremities. On exam, progressively worsening of varicose veins noted, especially in the RLE. Patient reports symptoms are equal bilaterally and worse with laying flat. Although it is possible to have discomfort and pain from varicose veins, patient's presentation appears more consistent with neuropathy and possibly radiculopathy. We discussed the option of pursuing vein procedures (i.e. stab phlebectomy), however, doing so may not address her current symptoms. \par \par I've advised the patient to follow up with her neurologist to discuss these symptoms and to r/o spinal disease. \par \par She will follow up in 6 months for re-evaluation of popliteal VEIN ANEURYSMS which are overall stable on duplex today. \par \par

## 2023-02-02 NOTE — HISTORY OF PRESENT ILLNESS
[FreeTextEntry1] : MARTINA GATES (: 1960) is a 62 year old female who presents today for bilateral leg pain x2 months. \par \par Reports pain as burning sensation behind the knees, Also complains of ascending pain from the shin toward the thighs. Symptoms associated with tingling of the toes. No aggravating factors. She takes amitryptyline for neuropathy which just makes her drowsy and dulls the pain. Denies trauma, swelling, claudication, rest pain, wounds. Has recurrence of VV especially prominent at the right knee. No chest pain or SOB.\par \par Prior history noted for chronic venous insufficiency and bilateral popliteal VEIN ANEURYSMS. She is s/p bilateral GSV ablations (post-op eHIT both times were treated successfully with short course of anticoagulation) as well as R popliteal VEIN aneurysmorrhaphy.\par \par PMH: HIV on Genvoya, HLD, peripheral neuropathy, lumbar arthritis, bilateral GSV ablations with eHIT (, ) \par Psx: R popliteal vein aneurysm repair, R GSV EVLT, L GSV RFA, R inguinal hernia repair x 2\par NKDA\par \par VLE today is negative for DVT/SVT. R popliteal VEIN ANEURYSM 1.7 x 1.5 cm. L popliteal VEIN ANEURYSM 1.8 x 1.6 cm. No evidence of venous insufficiency.

## 2023-02-02 NOTE — PHYSICAL EXAM
[2+] : left 2+ [Ankle Swelling (On Exam)] : present [Ankle Swelling Bilaterally] : bilaterally  [Varicose Veins Of Lower Extremities] : bilaterally [] : bilaterally [Skin Ulcer] : no ulcer [Calm] : calm [de-identified] : well appearing female, NAD  [de-identified] : unlabored [FreeTextEntry1] : scattered reticular and spider veins bilaterally. \par Large cluster of VV at medial R knee with branches noted mid thigh. \par Scattered small varicosities in LLE. \par Scant edema\par Bilateral gaiter hyperpigmentation [de-identified] : soft, NT

## 2023-03-30 ENCOUNTER — APPOINTMENT (OUTPATIENT)
Dept: VASCULAR SURGERY | Facility: CLINIC | Age: 63
End: 2023-03-30

## 2023-06-15 ENCOUNTER — APPOINTMENT (OUTPATIENT)
Dept: CT IMAGING | Facility: IMAGING CENTER | Age: 63
End: 2023-06-15
Payer: MEDICAID

## 2023-06-15 ENCOUNTER — OUTPATIENT (OUTPATIENT)
Dept: OUTPATIENT SERVICES | Facility: HOSPITAL | Age: 63
LOS: 1 days | End: 2023-06-15
Payer: MEDICAID

## 2023-06-15 DIAGNOSIS — R91.1 SOLITARY PULMONARY NODULE: ICD-10-CM

## 2023-06-15 DIAGNOSIS — Z98.89 OTHER SPECIFIED POSTPROCEDURAL STATES: Chronic | ICD-10-CM

## 2023-06-15 DIAGNOSIS — Z98.51 TUBAL LIGATION STATUS: Chronic | ICD-10-CM

## 2023-06-15 PROCEDURE — 71250 CT THORAX DX C-: CPT | Mod: 26

## 2023-06-15 PROCEDURE — 71250 CT THORAX DX C-: CPT

## 2023-07-25 ENCOUNTER — APPOINTMENT (OUTPATIENT)
Dept: PULMONOLOGY | Facility: CLINIC | Age: 63
End: 2023-07-25
Payer: MEDICAID

## 2023-07-25 VITALS
BODY MASS INDEX: 25.74 KG/M2 | DIASTOLIC BLOOD PRESSURE: 81 MMHG | WEIGHT: 164 LBS | TEMPERATURE: 97.3 F | SYSTOLIC BLOOD PRESSURE: 122 MMHG | HEART RATE: 78 BPM | OXYGEN SATURATION: 98 % | HEIGHT: 67 IN

## 2023-07-25 DIAGNOSIS — R91.1 SOLITARY PULMONARY NODULE: ICD-10-CM

## 2023-07-25 PROCEDURE — 99214 OFFICE O/P EST MOD 30 MIN: CPT

## 2023-07-25 NOTE — HISTORY OF PRESENT ILLNESS
[Former] : former [TextBox_4] : 63 yo F PMH lung nodule (2002), substance abuse, anxiety, depression, OA spine, Scoliosis presents for evaluation of pulmonary nodules. Denies new/ change in symptoms\par \par repeat CT chest in June- stable in size compared to 2020.\par \par \par  \par CT chest 11/2022:\par RLL suspicious 0.7 cm solid pulmonary nodule, increased in size.\par LLL 0.8 cm ground glass nodule increase in size with adjacent persistent tree-in-bud micro nodules, may be post inflammatory\par ~~~~~\par CT Chest 10/22/2020 IMPRESSION: \par 1. Decreased size and conspicuity of right lower lobe solid nodule now measuring up to 4mm. No new pulmonary nodule is identified.\par 2. Decreased conspicuity of previously seen left lower lobe peripheral tree-in-bud nodularity, consistent with improving bronchiolitis.\par ~~~~~. 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

## 2023-07-25 NOTE — ASSESSMENT
[FreeTextEntry1] : Lung nodule: 4 mm nodule on recent CT in June- unchanged compared to Oct 2020.  PET CT with minimal FDG update , less concerning for malignancy along with nodify results 1% risk of malignancy. Discussed with pt repeat imaging no warranted at this time given stability of nodules over 3 years. \par \par RTC  prn\par \par I, Marleni Lua, NP, am scribing for and in the presence of Dr. Homar Frazier, the following sections HISTORY OF PRESENT ILLNESS, PAST MEDICAL/FAMILY/SOCIAL HISTORY; REVIEW OF SYSTEMS; VITAL SIGNS; PHYSICAL EXAM; DISPOSITION.\par

## 2023-08-24 ENCOUNTER — APPOINTMENT (OUTPATIENT)
Dept: VASCULAR SURGERY | Facility: CLINIC | Age: 63
End: 2023-08-24
Payer: MEDICAID

## 2023-08-24 VITALS
BODY MASS INDEX: 22.76 KG/M2 | SYSTOLIC BLOOD PRESSURE: 116 MMHG | DIASTOLIC BLOOD PRESSURE: 83 MMHG | HEIGHT: 67 IN | TEMPERATURE: 97.7 F | HEART RATE: 68 BPM | WEIGHT: 145 LBS

## 2023-08-24 DIAGNOSIS — I83.893 VARICOSE VEINS OF BILATERAL LOWER EXTREMITIES WITH OTHER COMPLICATIONS: ICD-10-CM

## 2023-08-24 DIAGNOSIS — I86.8 VARICOSE VEINS OF OTHER SPECIFIED SITES: ICD-10-CM

## 2023-08-24 PROCEDURE — 93970 EXTREMITY STUDY: CPT

## 2023-08-24 PROCEDURE — 99213 OFFICE O/P EST LOW 20 MIN: CPT

## 2023-08-24 NOTE — ASSESSMENT
[FreeTextEntry1] : A/P   Patient history of vein aneurysms s/p R popliteal vein aneurysm repair. On duplex, the repair is intact and the vein size is stable. L popliteal vein is dilated. Advise continued monitoring and repeat evaluation with VLE in one year.   She also has symptomatic varicose veins with evidence of distal GSV reflux in the RLE. She has been complaint with conservative measures for a number of years. We discussed option to pursue additional vein procedures versus continued conservative management. Patient at this time defers vein treatments and would like to continue with current management.

## 2023-08-24 NOTE — PHYSICAL EXAM
[2+] : left 2+ [Ankle Swelling (On Exam)] : present [Ankle Swelling Bilaterally] : bilaterally  [Varicose Veins Of Lower Extremities] : bilaterally [] : bilaterally [Skin Ulcer] : no ulcer [Calm] : calm [de-identified] : well appearing female, NAD  [de-identified] : unlabored [FreeTextEntry1] : scattered reticular and spider veins bilaterally.  +VV R thigh, knee, and calf  Scattered small varicosities in LLE.  Scant edema Bilateral gaiter hyperpigmentation [de-identified] : soft, NT

## 2023-08-24 NOTE — HISTORY OF PRESENT ILLNESS
[FreeTextEntry1] : MARTINA GATES (: 1960) is a 63 year old female who presents today for follow up evaluation of popliteal vein aneurysms.   She has stable burning sensation behind the knees. Has been taking amitryptyline and gabapentin with mild improvement. Is following a neurologist.   H/O right popliteal vein aneurysm s/p repair H/O eHIT x2 after vein ablation procedures which were treated successfully with short course of anticoagulation Otherwise denies claudication, rest pain, poor healing.  No chest pain or SOB.  PMH: HIV on Genvoya, HLD, peripheral neuropathy, lumbar arthritis, bilateral GSV ablations with eHIT (, )  Psx: R popliteal vein aneurysm repair, R GSV EVLT, L GSV RFA, R inguinal hernia repair x 2 NKDA  2023 VLE today is negative for DVT/SVT.  R popliteal VEIN ANEURYSM s/p repair with stable size 1.7 x 1.6 cm.  L popliteal VEIN ANEURYSM 1.4 x 1.3 cm. Distal R GSV reflux noted

## 2023-12-27 ENCOUNTER — APPOINTMENT (OUTPATIENT)
Dept: UROGYNECOLOGY | Facility: CLINIC | Age: 63
End: 2023-12-27
Payer: MEDICAID

## 2023-12-27 VITALS
WEIGHT: 167 LBS | HEIGHT: 67 IN | HEART RATE: 74 BPM | DIASTOLIC BLOOD PRESSURE: 89 MMHG | SYSTOLIC BLOOD PRESSURE: 124 MMHG | BODY MASS INDEX: 26.21 KG/M2

## 2023-12-27 LAB
BILIRUB UR QL STRIP: NORMAL
CLARITY UR: CLEAR
COLLECTION METHOD: NORMAL
GLUCOSE UR-MCNC: NORMAL
HCG UR QL: 0.2 EU/DL
HGB UR QL STRIP.AUTO: NORMAL
KETONES UR-MCNC: NORMAL
LEUKOCYTE ESTERASE UR QL STRIP: NORMAL
NITRITE UR QL STRIP: NORMAL
PH UR STRIP: 5
PROT UR STRIP-MCNC: NORMAL
SP GR UR STRIP: 1.02

## 2023-12-27 PROCEDURE — 99204 OFFICE O/P NEW MOD 45 MIN: CPT | Mod: 25

## 2023-12-27 PROCEDURE — 51701 INSERT BLADDER CATHETER: CPT

## 2023-12-27 NOTE — PHYSICAL EXAM
[Chaperone Present] : A chaperone was present in the examining room during all aspects of the physical examination [FreeTextEntry1] : General: Well, appearing, no acute distress HEENT: Normocephalic, atraumatic Respiratory: Speaking in full sentences comfortably, normal work of breathing and no cough during visit Extremities: No upper extremity edema noted Skin: No obvious rash or skin lesions Neuro: Alert and oriented x 3, speech is fluent, normal rate Psych: Normal mood and affect [Normal Appearance] : general appearance was normal [Atrophy] : atrophy [1] : 1 [Aa ____] : Aa [unfilled] [Ba ____] : Ba [unfilled] [C ____] : C [unfilled] [GH ____] : GH [unfilled] [PB ____] : PB [unfilled] [TVL ____] : TVL  [unfilled] [Ap ____] : Ap [unfilled] [Bp ____] : Bp [unfilled] [D ____] : D [unfilled] [Normal] : normal

## 2023-12-27 NOTE — PROCEDURE
[FreeTextEntry1] : A sterile straight catheterization was performed to rule out a urinary tract infection and measure postvoid residual volume, which was 60 ml.

## 2023-12-27 NOTE — DISCUSSION/SUMMARY
[FreeTextEntry1] : I counseled Laura on the risk factors and etiologies of pelvic organ prolapse. Images were used to demonstrate the degree of the patient's prolapse. We reviewed management options, which included continued observation, Kegels and/or pelvic floor physical therapy, pessary, and surgery. Surgical we discussed performing an anterior colporrhaphy given isolated cystocele. We discussed that surgery is elective and carried and risk of recurrence in the future. The patient is bothered by her symptoms and is interested in pursuing surgical management. Given rare DANIELLE episodes, she will return for UDS and an RPA thereafter to discuss results and book surgery. IUGA handout on anterior colporrhaphy was provided.

## 2023-12-27 NOTE — HISTORY OF PRESENT ILLNESS
[FreeTextEntry1] : MARTINA GATES is a 63year-old P5 presenting for evaluation of prolapse. Started noticing a bulge in the vaginal area 2-3 months ago. She only feels it when she is cleaning herself. Does not have bulge sensation at other times. Denies any pain or discomfort from it. Noticed that her urinary flow is weaking and some praying of the urinary. Has occasional leakage with strong coughs. Rare UUI episodes at nighttime when she holds her urine.   Daytime voids: 4  Nighttime voids: 3  Her fluid intake includes: 2 cups of coffee, 3-5 bottles (16.9 oz) of water. Has issues with constipation. She was advised to use Senna, which helps.    PMH: HIV+, HLD, back problems PSH:  section, inguinal hernia x 2, tubal ligation OBGYN Hx:  x 4, C/S x 1 Social Hx: non-smoker, h/o substance abuse last in

## 2023-12-29 DIAGNOSIS — Z81.8 FAMILY HISTORY OF OTHER MENTAL AND BEHAVIORAL DISORDERS: ICD-10-CM

## 2023-12-29 DIAGNOSIS — Z82.3 FAMILY HISTORY OF STROKE: ICD-10-CM

## 2023-12-29 DIAGNOSIS — Z87.09 PERSONAL HISTORY OF OTHER DISEASES OF THE RESPIRATORY SYSTEM: ICD-10-CM

## 2023-12-29 DIAGNOSIS — K46.9 UNSPECIFIED ABDOMINAL HERNIA W/OUT OBSTRUCTION OR GANGRENE: ICD-10-CM

## 2023-12-29 DIAGNOSIS — Z86.59 PERSONAL HISTORY OF OTHER MENTAL AND BEHAVIORAL DISORDERS: ICD-10-CM

## 2023-12-29 DIAGNOSIS — Z86.39 PERSONAL HISTORY OF OTHER ENDOCRINE, NUTRITIONAL AND METABOLIC DISEASE: ICD-10-CM

## 2023-12-29 DIAGNOSIS — Z63.5 DISRUPTION OF FAMILY BY SEPARATION AND DIVORCE: ICD-10-CM

## 2023-12-29 LAB
APPEARANCE: CLEAR
BACTERIA UR CULT: NORMAL
BACTERIA: NEGATIVE /HPF
BILIRUBIN URINE: NEGATIVE
BLOOD URINE: NEGATIVE
CAST: 0 /LPF
COLOR: YELLOW
EPITHELIAL CELLS: 0 /HPF
GLUCOSE QUALITATIVE U: NEGATIVE MG/DL
KETONES URINE: NEGATIVE MG/DL
LEUKOCYTE ESTERASE URINE: NEGATIVE
MICROSCOPIC-UA: NORMAL
NITRITE URINE: NEGATIVE
PH URINE: 5.5
PROTEIN URINE: NEGATIVE MG/DL
RED BLOOD CELLS URINE: 0 /HPF
SPECIFIC GRAVITY URINE: 1.01
UROBILINOGEN URINE: 0.2 MG/DL
WHITE BLOOD CELLS URINE: 0 /HPF

## 2023-12-29 RX ORDER — TRIAMCINOLONE ACETONIDE 55 UG/1
55 SOLUTION/ DROPS OPHTHALMIC
Refills: 0 | Status: ACTIVE | COMMUNITY

## 2023-12-29 RX ORDER — CETIRIZINE HYDROCHLORIDE 5 MG/1
TABLET ORAL
Refills: 0 | Status: ACTIVE | COMMUNITY

## 2023-12-29 RX ORDER — BICTEGRAVIR SODIUM, EMTRICITABINE, AND TENOFOVIR ALAFENAMIDE FUMARATE 30; 120; 15 MG/1; MG/1; MG/1
TABLET ORAL
Refills: 0 | Status: ACTIVE | COMMUNITY

## 2023-12-29 RX ORDER — OMEPRAZOLE 40 MG/1
40 CAPSULE, DELAYED RELEASE ORAL
Refills: 0 | Status: ACTIVE | COMMUNITY

## 2023-12-29 RX ORDER — ONDANSETRON 4 MG/1
4 TABLET ORAL
Refills: 0 | Status: ACTIVE | COMMUNITY

## 2023-12-29 RX ORDER — DICLOFENAC SODIUM 10 MG/G
1 GEL TOPICAL
Refills: 0 | Status: ACTIVE | COMMUNITY

## 2023-12-29 RX ORDER — CETIRIZINE HYDROCHLORIDE 10 MG/1
10 TABLET, FILM COATED ORAL
Refills: 0 | Status: ACTIVE | COMMUNITY

## 2023-12-29 RX ORDER — LORATADINE 5 MG
TABLET,CHEWABLE ORAL
Refills: 0 | Status: ACTIVE | COMMUNITY

## 2023-12-29 RX ORDER — AZELASTINE HYDROCHLORIDE 0.5 MG/ML
0.05 SOLUTION/ DROPS OPHTHALMIC
Refills: 0 | Status: ACTIVE | COMMUNITY

## 2023-12-29 SDOH — SOCIAL STABILITY - SOCIAL INSECURITY: DISRUPTION OF FAMILY BY SEPARATION AND DIVORCE: Z63.5

## 2024-02-05 ENCOUNTER — APPOINTMENT (OUTPATIENT)
Dept: UROGYNECOLOGY | Facility: CLINIC | Age: 64
End: 2024-02-05

## 2024-02-15 ENCOUNTER — APPOINTMENT (OUTPATIENT)
Dept: UROGYNECOLOGY | Facility: CLINIC | Age: 64
End: 2024-02-15

## 2024-03-20 ENCOUNTER — APPOINTMENT (OUTPATIENT)
Dept: UROGYNECOLOGY | Facility: CLINIC | Age: 64
End: 2024-03-20
Payer: MEDICAID

## 2024-03-20 ENCOUNTER — RESULT CHARGE (OUTPATIENT)
Age: 64
End: 2024-03-20

## 2024-03-20 ENCOUNTER — OUTPATIENT (OUTPATIENT)
Dept: OUTPATIENT SERVICES | Facility: HOSPITAL | Age: 64
LOS: 1 days | End: 2024-03-20
Payer: MEDICAID

## 2024-03-20 VITALS — DIASTOLIC BLOOD PRESSURE: 80 MMHG | SYSTOLIC BLOOD PRESSURE: 125 MMHG | HEART RATE: 81 BPM

## 2024-03-20 DIAGNOSIS — Z98.89 OTHER SPECIFIED POSTPROCEDURAL STATES: Chronic | ICD-10-CM

## 2024-03-20 DIAGNOSIS — Z01.818 ENCOUNTER FOR OTHER PREPROCEDURAL EXAMINATION: ICD-10-CM

## 2024-03-20 DIAGNOSIS — Z98.51 TUBAL LIGATION STATUS: Chronic | ICD-10-CM

## 2024-03-20 LAB
BILIRUB UR QL STRIP: NEGATIVE
CLARITY UR: CLEAR
COLLECTION METHOD: NORMAL
GLUCOSE UR-MCNC: NEGATIVE
HCG UR QL: 0.2 EU/DL
HGB UR QL STRIP.AUTO: NORMAL
KETONES UR-MCNC: NEGATIVE
LEUKOCYTE ESTERASE UR QL STRIP: NEGATIVE
NITRITE UR QL STRIP: NEGATIVE
PH UR STRIP: 5.5
PROT UR STRIP-MCNC: NEGATIVE
SP GR UR STRIP: 1.03

## 2024-03-20 PROCEDURE — 81003 URINALYSIS AUTO W/O SCOPE: CPT | Mod: QW

## 2024-03-20 PROCEDURE — 51797 INTRAABDOMINAL PRESSURE TEST: CPT | Mod: 26

## 2024-03-20 PROCEDURE — 51797 INTRAABDOMINAL PRESSURE TEST: CPT

## 2024-03-20 PROCEDURE — 51784 ANAL/URINARY MUSCLE STUDY: CPT

## 2024-03-20 PROCEDURE — 51729 CYSTOMETROGRAM W/VP&UP: CPT

## 2024-03-20 PROCEDURE — 51784 ANAL/URINARY MUSCLE STUDY: CPT | Mod: 26

## 2024-03-20 PROCEDURE — 51729 CYSTOMETROGRAM W/VP&UP: CPT | Mod: 26

## 2024-03-21 ENCOUNTER — NON-APPOINTMENT (OUTPATIENT)
Age: 64
End: 2024-03-21

## 2024-03-21 LAB
APPEARANCE: CLEAR
BACTERIA: NEGATIVE /HPF
BILIRUBIN URINE: NEGATIVE
BLOOD URINE: NEGATIVE
CAST: 0 /LPF
COLOR: YELLOW
EPITHELIAL CELLS: 1 /HPF
GLUCOSE QUALITATIVE U: NEGATIVE MG/DL
KETONES URINE: NEGATIVE MG/DL
LEUKOCYTE ESTERASE URINE: NEGATIVE
MICROSCOPIC-UA: NORMAL
NITRITE URINE: NEGATIVE
PH URINE: 5.5
PROTEIN URINE: NEGATIVE MG/DL
RED BLOOD CELLS URINE: 1 /HPF
SPECIFIC GRAVITY URINE: 1.02
UROBILINOGEN URINE: 0.2 MG/DL
WHITE BLOOD CELLS URINE: 0 /HPF

## 2024-03-22 LAB — BACTERIA UR CULT: NORMAL

## 2024-04-11 ENCOUNTER — APPOINTMENT (OUTPATIENT)
Dept: UROGYNECOLOGY | Facility: CLINIC | Age: 64
End: 2024-04-11
Payer: MEDICAID

## 2024-04-11 VITALS
BODY MASS INDEX: 26.21 KG/M2 | WEIGHT: 167 LBS | DIASTOLIC BLOOD PRESSURE: 78 MMHG | HEIGHT: 67 IN | SYSTOLIC BLOOD PRESSURE: 125 MMHG

## 2024-04-11 DIAGNOSIS — N39.3 STRESS INCONTINENCE (FEMALE) (MALE): ICD-10-CM

## 2024-04-11 DIAGNOSIS — N81.11 CYSTOCELE, MIDLINE: ICD-10-CM

## 2024-04-11 PROCEDURE — 99212 OFFICE O/P EST SF 10 MIN: CPT

## 2024-04-11 NOTE — DISCUSSION/SUMMARY
[FreeTextEntry1] : We reviewed UDS results notable for DANIELLE. We reviewed management of POP and DANIELLE. Pt reports today that she is not bothered by prolapse and doesn't have any urinary symptoms in general and is interested in surgery in order to avoid having to do this when she is older. I reiterated with patient that surgery is elective and there is always a risk of recurrence, meaning that there is a possibility of needing additional surgery in the future when she is older if she has bothersome recurrence. We also reviewed risks of surgery but not limited to bleeding, infection, injury to organs in surrounding areas. After extensive discussion, patient decided that she will defer surgery as she is not particularly symptomatic. Patient to follow-up PRN if things change.

## 2024-04-11 NOTE — HISTORY OF PRESENT ILLNESS
[FreeTextEntry1] : Laura presents for follow of cystocele and to discuss UDS results. UDS notable for DANIELLE. At last visit pt was interested in undergoing surgical treatment of prolapse.

## 2024-05-29 NOTE — HISTORY OF PRESENT ILLNESS
[FreeTextEntry1] : INCOMPLETE NOTE....   MARTINA GATES (: 1960) is a 63 year old female who presents today for  MARTINA GATES (: 1960) is a 63 year old female who presents today for follow up evaluation of popliteal vein aneurysms.   She has stable burning sensation behind the knees. Has been taking amitryptyline and gabapentin with mild improvement. Is following a neurologist.   H/O right popliteal vein aneurysm s/p repair H/O eHIT x2 after vein ablation procedures which were treated successfully with short course of anticoagulation Otherwise denies claudication, rest pain, poor healing.  No chest pain or SOB.  PMH: HIV on Genvoya, HLD, peripheral neuropathy, lumbar arthritis, bilateral GSV ablations with eHIT (, )  Psx: R popliteal vein aneurysm repair, R GSV EVLT, L GSV RFA, R inguinal hernia repair x 2 NKDA  2023 VLE today is negative for DVT/SVT.  R popliteal VEIN ANEURYSM s/p repair with stable size 1.7 x 1.6 cm.  L popliteal VEIN ANEURYSM 1.4 x 1.3 cm. Distal R GSV reflux noted

## 2024-05-30 ENCOUNTER — APPOINTMENT (OUTPATIENT)
Dept: VASCULAR SURGERY | Facility: CLINIC | Age: 64
End: 2024-05-30
Payer: MEDICAID

## 2024-05-30 VITALS
DIASTOLIC BLOOD PRESSURE: 82 MMHG | TEMPERATURE: 97.9 F | WEIGHT: 167 LBS | SYSTOLIC BLOOD PRESSURE: 133 MMHG | HEIGHT: 67 IN | BODY MASS INDEX: 26.21 KG/M2 | HEART RATE: 65 BPM

## 2024-05-30 PROCEDURE — 93970 EXTREMITY STUDY: CPT

## 2024-05-30 PROCEDURE — 99212 OFFICE O/P EST SF 10 MIN: CPT

## 2024-06-29 NOTE — HISTORY OF PRESENT ILLNESS
Thumb spica velcro wrist splint applied to left wrist, cms intact to left fingers, pt verbalizes understanding of application and wear of wrist splint.   [TextBox_4] : 61 yo F PMH lung nodule (2002), substance abuse, anxiety, depression, OA spine, Scoliosis presents for evaluation of pulmonary nodules and PET results. Denies new/ change in symptoms\par  \par CT chest 11/2022:\par RLL suspicious 0.7 cm solid pulmonary nodule, increased in size.\par LLL 0.8 cm ground glass nodule increase in size with adjacent persistent tree-in-bud micro nodules, may be post inflammatory\par ~~~~~\par CT Chest 10/22/2020 IMPRESSION: \par 1. Decreased size and conspicuity of right lower lobe solid nodule now measuring up to 4mm. No new pulmonary nodule is identified.\par 2. Decreased conspicuity of previously seen left lower lobe peripheral tree-in-bud nodularity, consistent with improving bronchiolitis.\par ~~~~~. \par \par \par Social:\par Prior tobacco use when smoking crack\par Former substance abuse / smoked crack for about 8 years, most days of the week, quit in 1991\par Previously lived with ex- who owned laser for engraving - notes exposure to chemical fumes.\par \par

## 2024-08-28 NOTE — HISTORY OF PRESENT ILLNESS
[FreeTextEntry1] : INCOMPLETE NOTE.... MARTINA GATES (: 1960) is a 64 year old female who presents today for   MARTINA GATES (: 1960) is a 63 year old female who presents today for follow up evaluation of popliteal vein aneurysms.   H/O right popliteal vein aneurysm s/p repair H/O eHIT x2 after vein ablation procedures which were treated successfully with short course of anticoagulation  No new complaints today. Chronic neuropathy and varicose veins. Denies worsening swelling, claudication, rest pain, poor healing.  PMH: HIV on Genvoya, HLD, peripheral neuropathy, lumbar arthritis, bilateral GSV ablations with eHIT (, )  Psx: R popliteal vein aneurysm repair, R GSV EVLT, L GSV RFA, R inguinal hernia repair x 2 NKDA  2024 VLE today is negative for DVT/SVT.  R popliteal VEIN ANEURYSM s/p repair with stable size 1.7 x 1.5 cm.  L popliteal VEIN ANEURYSM 1.4 x 1.5 cm. Distal bilateral GSV reflux noted

## 2024-08-29 ENCOUNTER — APPOINTMENT (OUTPATIENT)
Dept: VASCULAR SURGERY | Facility: CLINIC | Age: 64
End: 2024-08-29

## 2025-02-20 ENCOUNTER — APPOINTMENT (OUTPATIENT)
Dept: VASCULAR SURGERY | Facility: CLINIC | Age: 65
End: 2025-02-20
Payer: MEDICARE

## 2025-02-20 VITALS — DIASTOLIC BLOOD PRESSURE: 85 MMHG | HEART RATE: 69 BPM | SYSTOLIC BLOOD PRESSURE: 135 MMHG

## 2025-02-20 VITALS
BODY MASS INDEX: 24.48 KG/M2 | SYSTOLIC BLOOD PRESSURE: 141 MMHG | DIASTOLIC BLOOD PRESSURE: 80 MMHG | WEIGHT: 156 LBS | HEART RATE: 64 BPM | HEIGHT: 67 IN | TEMPERATURE: 97.7 F

## 2025-02-20 DIAGNOSIS — I83.893 VARICOSE VEINS OF BILATERAL LOWER EXTREMITIES WITH OTHER COMPLICATIONS: ICD-10-CM

## 2025-02-20 DIAGNOSIS — I86.8 VARICOSE VEINS OF OTHER SPECIFIED SITES: ICD-10-CM

## 2025-02-20 PROCEDURE — 99212 OFFICE O/P EST SF 10 MIN: CPT

## 2025-02-20 PROCEDURE — 93970 EXTREMITY STUDY: CPT

## 2025-03-26 NOTE — ED ADULT NURSE NOTE - CADM POA URETHRAL CATHETER
No

## 2025-04-11 NOTE — ED ADULT NURSE NOTE - TEMPLATE LIST FOR HEAD TO TOE ASSESSMENT
Respiratory
[FreeTextEntry1] : I have independently reviewed the following:  CT chest on 1/20/2025 ([facility]): PFTs on 2/17/25 PET/CT on 2/24/25
[FreeTextEntry1] : I have independently reviewed the following:  CT chest on 1/20/2025 ([facility]): PFTs on 2/17/25 PET/CT on 2/24/25

## 2025-06-02 ENCOUNTER — APPOINTMENT (OUTPATIENT)
Dept: VASCULAR SURGERY | Facility: CLINIC | Age: 65
End: 2025-06-02

## 2025-06-04 ENCOUNTER — APPOINTMENT (OUTPATIENT)
Dept: VASCULAR SURGERY | Facility: CLINIC | Age: 65
End: 2025-06-04